# Patient Record
Sex: MALE | Race: BLACK OR AFRICAN AMERICAN
[De-identification: names, ages, dates, MRNs, and addresses within clinical notes are randomized per-mention and may not be internally consistent; named-entity substitution may affect disease eponyms.]

---
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
## 2023-08-09 ENCOUNTER — HOSPITAL ENCOUNTER (EMERGENCY)
Dept: HOSPITAL 87 - ER | Age: 23
Discharge: HOME | End: 2023-08-09
Payer: COMMERCIAL

## 2023-08-09 VITALS
OXYGEN SATURATION: 99 % | HEART RATE: 99 BPM | RESPIRATION RATE: 17 BRPM | SYSTOLIC BLOOD PRESSURE: 128 MMHG | TEMPERATURE: 98.6 F | DIASTOLIC BLOOD PRESSURE: 83 MMHG

## 2023-08-09 VITALS — BODY MASS INDEX: 19.47 KG/M2 | WEIGHT: 105.82 LBS | HEIGHT: 62 IN

## 2023-08-09 DIAGNOSIS — F31.9: ICD-10-CM

## 2023-08-09 DIAGNOSIS — F43.10: ICD-10-CM
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
## 2023-08-09 DIAGNOSIS — F90.9: ICD-10-CM

## 2023-08-09 DIAGNOSIS — T65.893A: Primary | ICD-10-CM
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
## 2023-08-09 PROCEDURE — 99283 EMERGENCY DEPT VISIT LOW MDM: CPT
